# Patient Record
Sex: MALE | Race: WHITE | NOT HISPANIC OR LATINO | Employment: FULL TIME | URBAN - NONMETROPOLITAN AREA
[De-identification: names, ages, dates, MRNs, and addresses within clinical notes are randomized per-mention and may not be internally consistent; named-entity substitution may affect disease eponyms.]

---

## 2021-07-20 ENCOUNTER — OFFICE VISIT (OUTPATIENT)
Dept: CARDIOLOGY | Facility: CLINIC | Age: 66
End: 2021-07-20

## 2021-07-20 VITALS
DIASTOLIC BLOOD PRESSURE: 74 MMHG | HEART RATE: 69 BPM | SYSTOLIC BLOOD PRESSURE: 128 MMHG | HEIGHT: 68 IN | WEIGHT: 251 LBS | OXYGEN SATURATION: 96 % | TEMPERATURE: 98.7 F | BODY MASS INDEX: 38.04 KG/M2

## 2021-07-20 DIAGNOSIS — R94.31 ABNORMAL EKG: Primary | ICD-10-CM

## 2021-07-20 PROCEDURE — 93000 ELECTROCARDIOGRAM COMPLETE: CPT | Performed by: INTERNAL MEDICINE

## 2021-07-20 PROCEDURE — 99204 OFFICE O/P NEW MOD 45 MIN: CPT | Performed by: NURSE PRACTITIONER

## 2021-07-20 RX ORDER — EPINEPHRINE 0.3 MG/.3ML
INJECTION SUBCUTANEOUS
COMMUNITY
Start: 2021-06-23

## 2021-07-20 RX ORDER — ATORVASTATIN CALCIUM 20 MG/1
TABLET, FILM COATED ORAL
COMMUNITY
Start: 2021-07-17

## 2021-07-20 RX ORDER — LISINOPRIL 30 MG/1
30 TABLET ORAL DAILY
COMMUNITY

## 2021-07-20 RX ORDER — ASPIRIN 81 MG/1
TABLET, CHEWABLE ORAL
COMMUNITY

## 2021-07-20 RX ORDER — ACETAMINOPHEN 325 MG/1
325 TABLET ORAL EVERY 6 HOURS PRN
COMMUNITY

## 2021-07-20 NOTE — PROGRESS NOTES
Abnormal ECG (chief complaint)      History of Present Illness    Mr. Hsu is a 65 year old patient who presents today for abnormal EKG during medicare wellness exam. He had a previous abnormal EKG in the past in 2019.   EKG today is WNL.    Walks daily. He will walk a mile in 15-16 minutes. This is on the street with hills and inclines. He does not have exertional chest pain or dyspnea.     He has seen [facility] for abnormal EKG. No further interventions we required.     He does not have personal of CAD. Dad  at 35 from MI due to HTN and obesity. Grandfather was heavy smoker and  at 52. Sister has had 2 strokes. Brother has COPD, cirrhosis+ ETOH. Has an uncle that had ALS.     He is not a smoker. He is on a Metformin for an elevated A1c. He was just started on Lipitor 20mg nightly. He is on ASA 81mg.     He is on Lisinopril 30mg for 20+ years.     He push mows his yard. He works hard outside. He does not have any trouble with this.       Cardiac Risk Factors:  The ASCVD Risk score (Mariano MARIS Jr., et al., 2013) failed to calculate for the following reasons:    Cannot find a previous HDL lab    Cannot find a previous total cholesterol lab      Past Medical History:   Diagnosis Date   • Abnormal ECG    • Hypertension      Past Surgical History:   Procedure Laterality Date   • APPENDECTOMY     • HERNIA REPAIR  2019     Social History     Socioeconomic History   • Marital status:      Spouse name: Not on file   • Number of children: Not on file   • Years of education: Not on file   • Highest education level: Not on file   Tobacco Use   • Smoking status: Never Smoker   • Smokeless tobacco: Never Used   Substance and Sexual Activity   • Alcohol use: Yes     Comment: socially   • Drug use: Never   • Sexual activity: Defer     Family History   Problem Relation Age of Onset   • Heart attack Father    • Hypertension Father        ALLERGIES:  Allergies   Allergen Reactions   • Bee Venom Hives   • Latex,  Natural Rubber Rash     Light rash       Advance Care Planning   ACP discussion was declined by the patient. Patient does not have an advance directive, declines further assistance.       Review of Systems   Constitutional: Negative for chills, decreased appetite and fever.   HENT: Negative.    Eyes: Negative.    Cardiovascular: Negative for chest pain, claudication, dyspnea on exertion, irregular heartbeat, leg swelling and palpitations.   Respiratory: Negative for cough, shortness of breath and wheezing.    Endocrine: Negative.    Skin: Negative for dry skin, flushing and rash.   Musculoskeletal: Negative for falls and myalgias.   Gastrointestinal: Negative for abdominal pain, change in bowel habit and melena.   Genitourinary: Negative for frequency and hematuria.   Neurological: Negative for dizziness, light-headedness, loss of balance and weakness.   Psychiatric/Behavioral: Negative for altered mental status and memory loss. The patient is not nervous/anxious.        Current Outpatient Medications   Medication Sig Dispense Refill   • acetaminophen (TYLENOL) 325 MG tablet Take 325 mg by mouth Every 6 (Six) Hours As Needed.     • aspirin 81 MG chewable tablet Chew.     • atorvastatin (LIPITOR) 20 MG tablet      • EPINEPHrine (EPIPEN) 0.3 MG/0.3ML solution auto-injector injection      • lisinopril (PRINIVIL,ZESTRIL) 30 MG tablet Take 30 mg by mouth Daily.     • metFORMIN (GLUCOPHAGE) 500 MG tablet        No current facility-administered medications for this visit.       OBJECTIVE:    Physical Exam:   Constitutional:       General: Not in acute distress.     Appearance: Well-developed.   HENT:      Head: Normocephalic and atraumatic.   Neck:      Vascular: No JVD.   Pulmonary:      Effort: Pulmonary effort is normal. No respiratory distress.      Breath sounds: Normal breath sounds.   Cardiovascular:      Normal rate. Regular rhythm.   Pulses:     Intact distal pulses.   Abdominal:      General: Bowel sounds are  "normal.      Palpations: Abdomen is soft.   Musculoskeletal: Normal range of motion.      Cervical back: Normal range of motion. Skin:     General: Skin is warm and dry.      Findings: No erythema.   Neurological:      Mental Status: Alert and oriented to person, place, and time.   Psychiatric:         Behavior: Behavior normal.         Thought Content: Thought content normal.         Judgment: Judgment normal.       Vitals:    07/20/21 1413   BP: 128/74   BP Location: Left arm   Patient Position: Sitting   Cuff Size: Adult   Pulse: 69   Temp: 98.7 °F (37.1 °C)   SpO2: 96%   Weight: 114 kg (251 lb)   Height: 172.7 cm (68\")       DATA REVIEWED by me:       No radiology results for the last 30 days.       Labs Reviewed by me: BMP, CBC, LIPID, TSH  Lab Results   Component Value Date    GLUCOSE 124 (H) 12/04/2015    CALCIUM 9.8 12/04/2015     12/04/2015    K 5.1 12/04/2015    CO2 28 12/04/2015     12/04/2015    BUN 15 12/04/2015    CREATININE 1.1 12/04/2015    ANIONGAP 10.0 12/04/2015     Lab Results   Component Value Date    WBC 6.0 12/04/2015    HGB 16.0 12/04/2015    HCT 46.5 12/04/2015    MCV 89.9 12/04/2015     12/04/2015     No results found for: CHOL  No results found for: TRIG  No results found for: HDL  No components found for: LDLCALC  No results found for: LDL  No results found for: HDLLDLRATIO  No components found for: CHOLHDL  Lab Results   Component Value Date    TSH 1.58 12/04/2015     No results found for: PROBNP    EKG:               The following portions of the patient's history were reviewed and updated as appropriate: allergies, current medications, past family history, past medical history, past social history, past surgical history and problem list.  Old records that were reviewed and pertinent information is included in the above objective data.     ASSESSMENT/PLAN:       Diagnosis Plan   1. Abnormal EKG  - repeat EKG.   This is normal.     No chest pain or ischemia suspected. " BP is well controlled.     Activity: Approximately 150 minutes of moderate activity (such as walking program)  per week (20-30 minutes most days of the week)  Diet: Heart healthy foods - more fresh fruits and vegetables and whole grains; less red meat; more fish and poultry that is baked or grilled - not fried; less salt not to exceed 2000 mg daily - less processed food; No trans or saturated fat  Non Smoker    Diabetes management  Blood pressure management  Weight management: Patient's Body mass index is 38.16 kg/m². indicating that he is obese (BMI >30). Obesity-related health conditions include the following: hypertension and diabetes mellitus. Obesity is unchanged. BMI is is above average; BMI management plan is completed. We discussed portion control and increasing exercise..    Stress management    *Disease risk for type 2 diabetes. Hypertension and cardiovascular disease discussed with the patient  * Increased waist circumference (greater than 35 inches for females and 40 inches for males) also can be a marker for increased risk, even in persons of normal weight - patient made aware of this information         Follow up with PCP. No further interventions needed.     Time Spent: 31 minutes face to face counseling cardia risk assessment.           This document has been electronically signed by DENICE Miramontes on July 20, 2021 14:48 CDT

## 2021-07-21 LAB
QT INTERVAL: 400 MS
QTC INTERVAL: 409 MS